# Patient Record
Sex: FEMALE | URBAN - METROPOLITAN AREA
[De-identification: names, ages, dates, MRNs, and addresses within clinical notes are randomized per-mention and may not be internally consistent; named-entity substitution may affect disease eponyms.]

---

## 2022-07-13 ENCOUNTER — OFFICE VISIT (OUTPATIENT)
Dept: URBAN - METROPOLITAN AREA CLINIC 30 | Facility: CLINIC | Age: 52
End: 2022-07-13

## 2022-07-13 DIAGNOSIS — H40.013 OPEN ANGLE WITH BORDERLINE FINDINGS, LOW RISK, BILATERAL: ICD-10-CM

## 2022-07-13 DIAGNOSIS — E11.3293 DIABETES MELLITUS TYPE 2 WITH MILD NON-PROLIFERATIVE RETINOPATHY WITHOUT MACULAR EDEMA, BILATERAL: Primary | ICD-10-CM

## 2022-07-13 PROCEDURE — 99203 OFFICE O/P NEW LOW 30 MIN: CPT | Performed by: STUDENT IN AN ORGANIZED HEALTH CARE EDUCATION/TRAINING PROGRAM

## 2022-07-13 PROCEDURE — 92134 CPTRZ OPH DX IMG PST SGM RTA: CPT | Performed by: STUDENT IN AN ORGANIZED HEALTH CARE EDUCATION/TRAINING PROGRAM

## 2022-07-13 ASSESSMENT — KERATOMETRY
OD: 45.03
OS: 45.45

## 2022-07-13 ASSESSMENT — INTRAOCULAR PRESSURE
OD: 22
OS: 22

## 2022-07-13 ASSESSMENT — VISUAL ACUITY
OS: 20/25
OD: 20/25

## 2022-07-13 NOTE — IMPRESSION/PLAN
Impression: Diabetes mellitus Type 2 with mild non-proliferative retinopathy without macular edema, bilateral: B01.5125. -- used  line, ID #[id number] Plan: Pt ed on findings of mild diabetic retinopathy today without evidence of neovascularization. Reviewed benefits of steady blood sugar control. Cont f/u care with PCP. Pt ed to monitor vision and seek care with changes.  
RTC for annual diabetic eye exam

## 2022-07-13 NOTE — IMPRESSION/PLAN
Impression: Open angle with borderline findings, low risk, bilateral: H40.013. Plan: 2' c/d asymmetry
pt denies fhx
IOP today elevated OU @22/22 Pt ed concerns for glc and sight threatening concerns. No tx recommended at this time.